# Patient Record
Sex: FEMALE | Race: BLACK OR AFRICAN AMERICAN | NOT HISPANIC OR LATINO | URBAN - METROPOLITAN AREA
[De-identification: names, ages, dates, MRNs, and addresses within clinical notes are randomized per-mention and may not be internally consistent; named-entity substitution may affect disease eponyms.]

---

## 2021-04-22 ENCOUNTER — LAB OUTSIDE AN ENCOUNTER (OUTPATIENT)
Dept: URBAN - METROPOLITAN AREA CLINIC 115 | Facility: CLINIC | Age: 56
End: 2021-04-22

## 2021-04-22 ENCOUNTER — OFFICE VISIT (OUTPATIENT)
Dept: URBAN - METROPOLITAN AREA CLINIC 115 | Facility: CLINIC | Age: 56
End: 2021-04-22
Payer: COMMERCIAL

## 2021-04-22 ENCOUNTER — WEB ENCOUNTER (OUTPATIENT)
Dept: URBAN - METROPOLITAN AREA CLINIC 115 | Facility: CLINIC | Age: 56
End: 2021-04-22

## 2021-04-22 DIAGNOSIS — R10.9 ABDOMINAL PAIN: ICD-10-CM

## 2021-04-22 DIAGNOSIS — R14.3 GAS AND BLOATING: ICD-10-CM

## 2021-04-22 DIAGNOSIS — K21.9 GASTROESOPHAGEAL REFLUX DISEASE, UNSPECIFIED WHETHER ESOPHAGITIS PRESENT: ICD-10-CM

## 2021-04-22 DIAGNOSIS — R19.7 DIARRHEA: ICD-10-CM

## 2021-04-22 PROCEDURE — G8427 DOCREV CUR MEDS BY ELIG CLIN: HCPCS | Performed by: INTERNAL MEDICINE

## 2021-04-22 PROCEDURE — 99214 OFFICE O/P EST MOD 30 MIN: CPT | Performed by: INTERNAL MEDICINE

## 2021-04-22 PROCEDURE — G8417 CALC BMI ABV UP PARAM F/U: HCPCS | Performed by: INTERNAL MEDICINE

## 2021-04-22 PROCEDURE — G9903 PT SCRN TBCO ID AS NON USER: HCPCS | Performed by: INTERNAL MEDICINE

## 2021-04-22 RX ORDER — SODIUM, POTASSIUM,MAG SULFATES 17.5-3.13G
354ML SOLUTION, RECONSTITUTED, ORAL ORAL
Qty: 354 MILLILITER | Refills: 0 | OUTPATIENT
Start: 2021-04-22 | End: 2021-04-23

## 2021-04-22 RX ORDER — DICYCLOMINE HYDROCHLORIDE 10 MG/1
1 TABLET CAPSULE ORAL THREE TIMES A DAY
Qty: 270 TABLET | Refills: 1 | OUTPATIENT
Start: 2021-04-22 | End: 2021-10-19

## 2021-04-22 RX ORDER — CHOLESTYRAMINE 4 G/9G
1 PACKET MIXED WITH WATER OR NON-CARBONATED DRINK POWDER, FOR SUSPENSION ORAL TWICE A DAY
Qty: 60 | Refills: 1 | OUTPATIENT
Start: 2021-04-22

## 2021-04-22 RX ORDER — FLUTICASONE PROPIONATE 50 UG/1
SPRAY, METERED NASAL
Qty: 0 | Refills: 0 | Status: ACTIVE | COMMUNITY
Start: 1900-01-01

## 2021-04-22 RX ORDER — OMEPRAZOLE 40 MG/1
1 CAPSULE 30 MINUTES BEFORE MORNING MEAL CAPSULE, DELAYED RELEASE ORAL ONCE A DAY
Qty: 90 | Refills: 1 | OUTPATIENT
Start: 2021-04-22

## 2021-04-22 RX ORDER — CYCLOBENZAPRINE HYDROCHLORIDE 10 MG/1
TABLET, FILM COATED ORAL
Qty: 0 | Refills: 0 | Status: ACTIVE | COMMUNITY
Start: 1900-01-01

## 2021-04-22 RX ORDER — TRAMADOL HYDROCHLORIDE 50 MG/1
TABLET ORAL
Qty: 0 | Refills: 0 | Status: ACTIVE | COMMUNITY
Start: 1900-01-01

## 2021-04-22 NOTE — PHYSICAL EXAM GASTROINTESTINAL
Abdomen , soft, RUQ tenderness, nondistended , no guarding or rigidity , no masses palpable , high pitched bowel sounds , Liver and Spleen , no hepatomegaly present , no hepatosplenomegaly , liver nontender , spleen not palpable

## 2021-04-22 NOTE — HPI-TODAY'S VISIT:
04/22/2021 Patient presents for an office visit for evaluation of dyspepsia and abdominal pain. EGD done by Dr. Espinoza in 2016 was normal except for esophagitis. Colonoscopy showed 3mm polyp in the sigmoid colon and ascending colon.   Patient c/o digestion issues after her cholecystectomy done in 2001. Since then her digestion problems have been chronic. She also c/o diarrhea occuring immediately after eating and RUQ pain. She has about 2 episodes of loose bowel a day. Mostly high-fiber foods cause her diarrhea (grains, barley, cereal, oatmeal). Patient does take pantoprazole which helps slightly. She previously took omeprazole for several years. However, she has constant nausea, bloating and gas. She does not have any appetite. She also reports that after extensive time without a bowel movement, she feels very bloated. No family history of colon cancer, but she did have an aunt pass away from rectal cancer. Patient denies any anxiety or depression.

## 2021-04-22 NOTE — HPI-OTHER HISTORIES
02/28/2017 The patient is a 51 year old /Black female, who presents on referral from Taylor Porras MD, for a gastroenterology evaluation for abdominal pain. A copy of this document will be sent to the referring provider.  The patient has described the pain as moderate and dull and with intermittent times when it is sharp occurring constantly, and lasts months at a time. The pain has remained unchanged since it started. The location of the pain is in the RUQ and has been present for the past 7 years. The pain is improved by no particular treatment and is aggravated by no particular treatment. The pain is reproducible with palpation over the area.  Patient has a history of gallbladder disease.  The patient admits to no other complaints.     She had workup for the pain by Dr. Espinoza last June as follows.  EGD:  Mild esophagitis Colonoscopy:  Small polyps otherwise stan. CT abdomen with contrast:  CBD 9mm, intrahepatic cysts. MRI abdomen:  CBD 1.1cm, no choledocholelithiasis, pancreas normal, liver hemangioma. LFT's normal 10/3/2016.  She believes one of her liver tests were abnormal at one point but she is not sure.

## 2021-04-23 LAB
A/G RATIO: 1.7
ALBUMIN: 4.2
ALKALINE PHOSPHATASE: 73
ALT (SGPT): 13
AST (SGOT): 19
BASO (ABSOLUTE): 0.1
BASOS: 1
BILIRUBIN, TOTAL: 0.2
BUN/CREATININE RATIO: 17
BUN: 10
CALCIUM: 9.1
CARBON DIOXIDE, TOTAL: 25
CHLORIDE: 105
CREATININE: 0.6
EGFR IF AFRICN AM: 119
EGFR IF NONAFRICN AM: 103
EOS (ABSOLUTE): 0.2
EOS: 5
GLOBULIN, TOTAL: 2.5
GLUCOSE: 130
HEMATOCRIT: 40.9
HEMATOLOGY COMMENTS:: (no result)
HEMOGLOBIN: 13.3
IMMATURE CELLS: (no result)
IMMATURE GRANS (ABS): 0
IMMATURE GRANULOCYTES: 0
LYMPHS (ABSOLUTE): 1.4
LYMPHS: 34
MCH: 28.3
MCHC: 32.5
MCV: 87
MONOCYTES(ABSOLUTE): 0.3
MONOCYTES: 8
NEUTROPHILS (ABSOLUTE): 2.2
NEUTROPHILS: 52
NRBC: (no result)
PLATELETS: 217
POTASSIUM: 4
PROTEIN, TOTAL: 6.7
RBC: 4.7
RDW: 12.3
SODIUM: 143
WBC: 4.1

## 2021-06-14 ENCOUNTER — OFFICE VISIT (OUTPATIENT)
Dept: URBAN - METROPOLITAN AREA SURGERY CENTER 13 | Facility: SURGERY CENTER | Age: 56
End: 2021-06-14
Payer: COMMERCIAL

## 2021-06-14 DIAGNOSIS — K63.89 BACTERIAL OVERGROWTH SYNDROME: ICD-10-CM

## 2021-06-14 DIAGNOSIS — K62.1 DYSPLASTIC POLYP OF RECTUM: ICD-10-CM

## 2021-06-14 DIAGNOSIS — K52.9 CHRONIC DIARRHEA: ICD-10-CM

## 2021-06-14 PROCEDURE — 45380 COLONOSCOPY AND BIOPSY: CPT | Performed by: INTERNAL MEDICINE

## 2021-06-14 PROCEDURE — G8907 PT DOC NO EVENTS ON DISCHARG: HCPCS | Performed by: INTERNAL MEDICINE

## 2021-06-14 RX ORDER — CYCLOBENZAPRINE HYDROCHLORIDE 10 MG/1
TABLET, FILM COATED ORAL
Qty: 0 | Refills: 0 | Status: ACTIVE | COMMUNITY
Start: 1900-01-01

## 2021-06-14 RX ORDER — DICYCLOMINE HYDROCHLORIDE 10 MG/1
1 TABLET CAPSULE ORAL THREE TIMES A DAY
Qty: 270 TABLET | Refills: 1 | Status: ACTIVE | COMMUNITY
Start: 2021-04-22 | End: 2021-10-19

## 2021-06-14 RX ORDER — FLUTICASONE PROPIONATE 50 UG/1
SPRAY, METERED NASAL
Qty: 0 | Refills: 0 | Status: ACTIVE | COMMUNITY
Start: 1900-01-01

## 2021-06-14 RX ORDER — TRAMADOL HYDROCHLORIDE 50 MG/1
TABLET ORAL
Qty: 0 | Refills: 0 | Status: ACTIVE | COMMUNITY
Start: 1900-01-01

## 2021-06-14 RX ORDER — CHOLESTYRAMINE 4 G/9G
1 PACKET MIXED WITH WATER OR NON-CARBONATED DRINK POWDER, FOR SUSPENSION ORAL TWICE A DAY
Qty: 60 | Refills: 1 | Status: ACTIVE | COMMUNITY
Start: 2021-04-22

## 2021-06-14 RX ORDER — OMEPRAZOLE 40 MG/1
1 CAPSULE 30 MINUTES BEFORE MORNING MEAL CAPSULE, DELAYED RELEASE ORAL ONCE A DAY
Qty: 90 | Refills: 1 | Status: ACTIVE | COMMUNITY
Start: 2021-04-22

## 2021-07-14 ENCOUNTER — OFFICE VISIT (OUTPATIENT)
Dept: URBAN - METROPOLITAN AREA SURGERY CENTER 13 | Facility: SURGERY CENTER | Age: 56
End: 2021-07-14

## 2021-08-13 ENCOUNTER — OFFICE VISIT (OUTPATIENT)
Dept: URBAN - METROPOLITAN AREA SURGERY CENTER 13 | Facility: SURGERY CENTER | Age: 56
End: 2021-08-13
Payer: COMMERCIAL

## 2021-08-13 DIAGNOSIS — K22.8 COLUMNAR-LINED ESOPHAGUS: ICD-10-CM

## 2021-08-13 DIAGNOSIS — R10.13 ABDOMINAL DISCOMFORT, EPIGASTRIC: ICD-10-CM

## 2021-08-13 DIAGNOSIS — K31.89 ACQUIRED DEFORMITY OF DUODENUM: ICD-10-CM

## 2021-08-13 PROCEDURE — G8907 PT DOC NO EVENTS ON DISCHARG: HCPCS | Performed by: INTERNAL MEDICINE

## 2021-08-13 PROCEDURE — 43239 EGD BIOPSY SINGLE/MULTIPLE: CPT | Performed by: INTERNAL MEDICINE

## 2021-08-13 RX ORDER — FLUTICASONE PROPIONATE 50 UG/1
SPRAY, METERED NASAL
Qty: 0 | Refills: 0 | Status: ACTIVE | COMMUNITY
Start: 1900-01-01

## 2021-08-13 RX ORDER — CHOLESTYRAMINE 4 G/9G
1 PACKET MIXED WITH WATER OR NON-CARBONATED DRINK POWDER, FOR SUSPENSION ORAL TWICE A DAY
Qty: 60 | Refills: 1 | Status: ACTIVE | COMMUNITY
Start: 2021-04-22

## 2021-08-13 RX ORDER — PANTOPRAZOLE SODIUM 40 MG/1
1 TABLET TABLET, DELAYED RELEASE ORAL ONCE A DAY
Qty: 90 | Refills: 1 | OUTPATIENT
Start: 2021-08-13

## 2021-08-13 RX ORDER — DICYCLOMINE HYDROCHLORIDE 10 MG/1
1 TABLET CAPSULE ORAL THREE TIMES A DAY
Qty: 270 TABLET | Refills: 1 | Status: ACTIVE | COMMUNITY
Start: 2021-04-22 | End: 2021-10-19

## 2021-08-13 RX ORDER — OMEPRAZOLE 40 MG/1
1 CAPSULE 30 MINUTES BEFORE MORNING MEAL CAPSULE, DELAYED RELEASE ORAL ONCE A DAY
Qty: 90 | Refills: 1 | Status: ACTIVE | COMMUNITY
Start: 2021-04-22

## 2021-08-13 RX ORDER — TRAMADOL HYDROCHLORIDE 50 MG/1
TABLET ORAL
Qty: 0 | Refills: 0 | Status: ACTIVE | COMMUNITY
Start: 1900-01-01

## 2021-08-13 RX ORDER — CYCLOBENZAPRINE HYDROCHLORIDE 10 MG/1
TABLET, FILM COATED ORAL
Qty: 0 | Refills: 0 | Status: ACTIVE | COMMUNITY
Start: 1900-01-01

## 2021-09-16 ENCOUNTER — OFFICE VISIT (OUTPATIENT)
Dept: URBAN - METROPOLITAN AREA CLINIC 115 | Facility: CLINIC | Age: 56
End: 2021-09-16

## 2021-09-16 RX ORDER — DICYCLOMINE HYDROCHLORIDE 10 MG/1
1 TABLET CAPSULE ORAL THREE TIMES A DAY
Qty: 270 TABLET | Refills: 1 | Status: ACTIVE | COMMUNITY
Start: 2021-04-22 | End: 2021-10-19

## 2021-09-16 RX ORDER — FLUTICASONE PROPIONATE 50 UG/1
SPRAY, METERED NASAL
Qty: 0 | Refills: 0 | Status: ACTIVE | COMMUNITY
Start: 1900-01-01

## 2021-09-16 RX ORDER — TRAMADOL HYDROCHLORIDE 50 MG/1
TABLET ORAL
Qty: 0 | Refills: 0 | Status: ACTIVE | COMMUNITY
Start: 1900-01-01

## 2021-09-16 RX ORDER — CYCLOBENZAPRINE HYDROCHLORIDE 10 MG/1
TABLET, FILM COATED ORAL
Qty: 0 | Refills: 0 | Status: ACTIVE | COMMUNITY
Start: 1900-01-01

## 2021-09-16 RX ORDER — OMEPRAZOLE 40 MG/1
1 CAPSULE 30 MINUTES BEFORE MORNING MEAL CAPSULE, DELAYED RELEASE ORAL ONCE A DAY
Qty: 90 | Refills: 1 | Status: ACTIVE | COMMUNITY
Start: 2021-04-22

## 2021-09-16 RX ORDER — CHOLESTYRAMINE 4 G/9G
1 PACKET MIXED WITH WATER OR NON-CARBONATED DRINK POWDER, FOR SUSPENSION ORAL TWICE A DAY
Qty: 60 | Refills: 1 | Status: ACTIVE | COMMUNITY
Start: 2021-04-22

## 2021-09-16 RX ORDER — PANTOPRAZOLE SODIUM 40 MG/1
1 TABLET TABLET, DELAYED RELEASE ORAL ONCE A DAY
Qty: 90 | Refills: 1 | Status: ACTIVE | COMMUNITY
Start: 2021-08-13

## 2021-10-28 ENCOUNTER — OFFICE VISIT (OUTPATIENT)
Dept: URBAN - METROPOLITAN AREA CLINIC 115 | Facility: CLINIC | Age: 56
End: 2021-10-28
Payer: COMMERCIAL

## 2021-10-28 ENCOUNTER — OFFICE VISIT (OUTPATIENT)
Dept: URBAN - METROPOLITAN AREA CLINIC 115 | Facility: CLINIC | Age: 56
End: 2021-10-28

## 2021-10-28 DIAGNOSIS — K21.9 GASTROESOPHAGEAL REFLUX DISEASE, UNSPECIFIED WHETHER ESOPHAGITIS PRESENT: ICD-10-CM

## 2021-10-28 DIAGNOSIS — R19.7 DIARRHEA: ICD-10-CM

## 2021-10-28 DIAGNOSIS — R10.9 ABDOMINAL PAIN: ICD-10-CM

## 2021-10-28 DIAGNOSIS — K25.3 ACUTE GASTRIC ULCER WITHOUT HEMORRHAGE OR PERFORATION: ICD-10-CM

## 2021-10-28 DIAGNOSIS — R14.3 GAS AND BLOATING: ICD-10-CM

## 2021-10-28 PROCEDURE — G8427 DOCREV CUR MEDS BY ELIG CLIN: HCPCS | Performed by: INTERNAL MEDICINE

## 2021-10-28 PROCEDURE — G9903 PT SCRN TBCO ID AS NON USER: HCPCS | Performed by: INTERNAL MEDICINE

## 2021-10-28 PROCEDURE — 99214 OFFICE O/P EST MOD 30 MIN: CPT | Performed by: INTERNAL MEDICINE

## 2021-10-28 PROCEDURE — G8417 CALC BMI ABV UP PARAM F/U: HCPCS | Performed by: INTERNAL MEDICINE

## 2021-10-28 RX ORDER — FLUTICASONE PROPIONATE 50 UG/1
SPRAY, METERED NASAL
Qty: 0 | Refills: 0 | Status: ACTIVE | COMMUNITY
Start: 1900-01-01

## 2021-10-28 RX ORDER — DICYCLOMINE HYDROCHLORIDE 10 MG/1
1 TABLET CAPSULE ORAL THREE TIMES A DAY
Qty: 270 TABLET | Refills: 1 | OUTPATIENT
Start: 2021-10-28 | End: 2022-04-26

## 2021-10-28 RX ORDER — CHOLESTYRAMINE 4 G/9G
1 PACKET MIXED WITH WATER OR NON-CARBONATED DRINK POWDER, FOR SUSPENSION ORAL TWICE A DAY
Qty: 60 | Refills: 1 | Status: ACTIVE | COMMUNITY
Start: 2021-04-22

## 2021-10-28 RX ORDER — OMEPRAZOLE 40 MG/1
1 CAPSULE 30 MINUTES BEFORE MORNING MEAL CAPSULE, DELAYED RELEASE ORAL ONCE A DAY
Qty: 90 | Refills: 1 | Status: ACTIVE | COMMUNITY
Start: 2021-04-22

## 2021-10-28 RX ORDER — CYCLOBENZAPRINE HYDROCHLORIDE 10 MG/1
TABLET, FILM COATED ORAL
Qty: 0 | Refills: 0 | Status: ACTIVE | COMMUNITY
Start: 1900-01-01

## 2021-10-28 RX ORDER — PANTOPRAZOLE SODIUM 40 MG/1
1 TABLET TABLET, DELAYED RELEASE ORAL ONCE A DAY
Qty: 90 | Refills: 1 | Status: ACTIVE | COMMUNITY
Start: 2021-08-13

## 2021-10-28 RX ORDER — TRAMADOL HYDROCHLORIDE 50 MG/1
TABLET ORAL
Qty: 0 | Refills: 0 | Status: ACTIVE | COMMUNITY
Start: 1900-01-01

## 2021-10-28 RX ORDER — SUCRALFATE 1 G
1 TABLET ON AN EMPTY STOMACH TABLET ORAL TWICE A DAY
Qty: 180 TABLET | Refills: 1 | OUTPATIENT
Start: 2021-10-28 | End: 2022-04-26

## 2021-10-28 NOTE — HPI-TODAY'S VISIT:
04/22/2021 Patient presents for an office visit for evaluation of dyspepsia and abdominal pain. EGD done by Dr. Espinoza in 2016 was normal except for esophagitis. Colonoscopy showed 3mm polyp in the sigmoid colon and ascending colon.   Patient c/o digestion issues after her cholecystectomy done in 2001. Since then her digestion problems have been chronic. She also c/o diarrhea occuring immediately after eating and RUQ pain. She has about 2 episodes of loose bowel a day. Mostly high-fiber foods cause her diarrhea (grains, barley, cereal, oatmeal). Patient does take pantoprazole which helps slightly. She previously took omeprazole for several years. However, she has constant nausea, bloating and gas. She does not have any appetite. She also reports that after extensive time without a bowel movement, she feels very bloated. No family history of colon cancer, but she did have an aunt pass away from rectal cancer. Patient denies any anxiety or depression.  10/28/2021 Patient presents for a follow up office visit. EGD on 8/13/2021 showed gastric ulcers and gastritis. Biopsies showed no H pylori. Patient uses Ibuprofen daily for chronic lower back pain for the past 20 years. She currently takes Pantoprazole. Bloating has improved on Dicyclomine. RUQ pain has also improved on Dicyclomine but still presents occasionally. Labs from 04/2021 showed normal liver enzymes.

## 2022-01-07 ENCOUNTER — TELEPHONE ENCOUNTER (OUTPATIENT)
Dept: URBAN - METROPOLITAN AREA CLINIC 115 | Facility: CLINIC | Age: 57
End: 2022-01-07

## 2022-01-07 RX ORDER — SUCRALFATE 1 G/10ML
10 ML ON AN EMPTY STOMACH SUSPENSION ORAL TWICE A DAY
Qty: 600 | OUTPATIENT
Start: 2022-01-07 | End: 2022-02-06

## 2022-01-11 ENCOUNTER — TELEPHONE ENCOUNTER (OUTPATIENT)
Dept: URBAN - METROPOLITAN AREA CLINIC 115 | Facility: CLINIC | Age: 57
End: 2022-01-11

## 2022-01-27 ENCOUNTER — LAB OUTSIDE AN ENCOUNTER (OUTPATIENT)
Dept: URBAN - METROPOLITAN AREA CLINIC 115 | Facility: CLINIC | Age: 57
End: 2022-01-27

## 2022-01-27 ENCOUNTER — OFFICE VISIT (OUTPATIENT)
Dept: URBAN - METROPOLITAN AREA CLINIC 115 | Facility: CLINIC | Age: 57
End: 2022-01-27
Payer: COMMERCIAL

## 2022-01-27 DIAGNOSIS — K25.3 ACUTE GASTRIC ULCER WITHOUT HEMORRHAGE OR PERFORATION: ICD-10-CM

## 2022-01-27 DIAGNOSIS — K21.9 GASTROESOPHAGEAL REFLUX DISEASE, UNSPECIFIED WHETHER ESOPHAGITIS PRESENT: ICD-10-CM

## 2022-01-27 DIAGNOSIS — R19.7 DIARRHEA: ICD-10-CM

## 2022-01-27 DIAGNOSIS — R10.9 ABDOMINAL PAIN: ICD-10-CM

## 2022-01-27 DIAGNOSIS — R14.3 GAS AND BLOATING: ICD-10-CM

## 2022-01-27 DIAGNOSIS — K22.5 ZENKER'S DIVERTICULUM: ICD-10-CM

## 2022-01-27 PROCEDURE — G8427 DOCREV CUR MEDS BY ELIG CLIN: HCPCS | Performed by: INTERNAL MEDICINE

## 2022-01-27 PROCEDURE — 99214 OFFICE O/P EST MOD 30 MIN: CPT | Performed by: INTERNAL MEDICINE

## 2022-01-27 PROCEDURE — G8417 CALC BMI ABV UP PARAM F/U: HCPCS | Performed by: INTERNAL MEDICINE

## 2022-01-27 PROCEDURE — G9903 PT SCRN TBCO ID AS NON USER: HCPCS | Performed by: INTERNAL MEDICINE

## 2022-01-27 RX ORDER — DICYCLOMINE HYDROCHLORIDE 10 MG/1
1 TABLET CAPSULE ORAL THREE TIMES A DAY
Qty: 270 TABLET | Refills: 1 | OUTPATIENT
Start: 2022-01-27 | End: 2022-07-26

## 2022-01-27 RX ORDER — TRAMADOL HYDROCHLORIDE 50 MG/1
TABLET ORAL
Qty: 0 | Refills: 0 | Status: ACTIVE | COMMUNITY
Start: 1900-01-01

## 2022-01-27 RX ORDER — CYCLOBENZAPRINE HYDROCHLORIDE 10 MG/1
TABLET, FILM COATED ORAL
Qty: 0 | Refills: 0 | Status: ACTIVE | COMMUNITY
Start: 1900-01-01

## 2022-01-27 RX ORDER — DICYCLOMINE HYDROCHLORIDE 10 MG/1
1 TABLET CAPSULE ORAL THREE TIMES A DAY
Qty: 270 TABLET | Refills: 1 | Status: ACTIVE | COMMUNITY
Start: 2021-10-28 | End: 2022-04-26

## 2022-01-27 RX ORDER — PANTOPRAZOLE SODIUM 40 MG/1
1 TABLET TABLET, DELAYED RELEASE ORAL ONCE A DAY
Qty: 90 | Refills: 1 | Status: ACTIVE | COMMUNITY
Start: 2021-08-13

## 2022-01-27 RX ORDER — ESOMEPRAZOLE MAGNESIUM 40 MG/1
1 CAPSULE CAPSULE, DELAYED RELEASE ORAL ONCE A DAY
Qty: 90 CAPSULE | Refills: 1 | OUTPATIENT
Start: 2022-01-27

## 2022-01-27 RX ORDER — CHOLESTYRAMINE 4 G/9G
1 PACKET MIXED WITH WATER OR NON-CARBONATED DRINK POWDER, FOR SUSPENSION ORAL TWICE A DAY
Qty: 60 | Refills: 1 | Status: ACTIVE | COMMUNITY
Start: 2021-04-22

## 2022-01-27 RX ORDER — SUCRALFATE 1 G
1 TABLET ON AN EMPTY STOMACH TABLET ORAL TWICE A DAY
Qty: 180 TABLET | Refills: 1 | Status: ACTIVE | COMMUNITY
Start: 2021-10-28 | End: 2022-04-26

## 2022-01-27 RX ORDER — FLUTICASONE PROPIONATE 50 UG/1
SPRAY, METERED NASAL
Qty: 0 | Refills: 0 | Status: ACTIVE | COMMUNITY
Start: 1900-01-01

## 2022-01-27 RX ORDER — OMEPRAZOLE 40 MG/1
1 CAPSULE 30 MINUTES BEFORE MORNING MEAL CAPSULE, DELAYED RELEASE ORAL ONCE A DAY
Qty: 90 | Refills: 1 | Status: ACTIVE | COMMUNITY
Start: 2021-04-22

## 2022-01-27 RX ORDER — SUCRALFATE 1 G/10ML
10 ML ON AN EMPTY STOMACH SUSPENSION ORAL TWICE A DAY
Qty: 600 | Status: ACTIVE | COMMUNITY
Start: 2022-01-07 | End: 2022-02-06

## 2022-01-27 NOTE — PHYSICAL EXAM GASTROINTESTINAL
Abdomen , soft, right flank tenderness, nondistended , no guarding or rigidity , no masses palpable , normal bowel sounds , Liver and Spleen , no hepatomegaly present , no hepatosplenomegaly , liver nontender , spleen not palpable

## 2022-01-27 NOTE — HPI-TODAY'S VISIT:
04/22/2021 Patient presents for an office visit for evaluation of dyspepsia and abdominal pain. EGD done by Dr. Espinoza in 2016 was normal except for esophagitis. Colonoscopy showed 3mm polyp in the sigmoid colon and ascending colon.   Patient c/o digestion issues after her cholecystectomy done in 2001. Since then her digestion problems have been chronic. She also c/o diarrhea occuring immediately after eating and RUQ pain. She has about 2 episodes of loose bowel a day. Mostly high-fiber foods cause her diarrhea (grains, barley, cereal, oatmeal). Patient does take pantoprazole which helps slightly. She previously took omeprazole for several years. However, she has constant nausea, bloating and gas. She does not have any appetite. She also reports that after extensive time without a bowel movement, she feels very bloated. No family history of colon cancer, but she did have an aunt pass away from rectal cancer. Patient denies any anxiety or depression.  10/28/2021 Patient presents for a follow up office visit. EGD on 8/13/2021 showed gastric ulcers and gastritis. Biopsies showed no H pylori. Patient uses Ibuprofen daily for chronic lower back pain for the past 20 years. She currently takes Pantoprazole. Bloating has improved on Dicyclomine. RUQ pain has also improved on Dicyclomine but still presents occasionally. Labs from 04/2021 showed normal liver enzymes.   01/27/2022 Patient reports she is still experiencing chest pain, esophageal pain, and RUQ pain. She takes Protonix once a day and Dicyclomine as needed. RUQ pain is constant, dull and associated with nausea and lack of appetite. Dicyclomine improves abdominal pain somewhat. Patient does have sciatica and history of kidney stones. She does not notice much difference between Omeprazole and Pantoprazole. Nexium was effective for her in the past. Patient also c/o sore throat and dysphagia for pills. She is unable to swallow Carafate pills, so she has been using Carafate suspension. Barium swallow study done recently showed Zenker's diverticulum and small hiatal hernia. Her ENT doctor recommended surgery for Zenker's diverticulum, and she is scheduled to meet with surgeon at [facility] next week.

## 2022-01-31 ENCOUNTER — TELEPHONE ENCOUNTER (OUTPATIENT)
Dept: URBAN - METROPOLITAN AREA CLINIC 92 | Facility: CLINIC | Age: 57
End: 2022-01-31

## 2022-01-31 RX ORDER — RABEPRAZOLE SODIUM 20 MG/1
1 TABLET TABLET, DELAYED RELEASE ORAL ONCE A DAY
Qty: 90 | Refills: 1 | OUTPATIENT
Start: 2022-01-31

## 2022-02-11 ENCOUNTER — WEB ENCOUNTER (OUTPATIENT)
Dept: URBAN - METROPOLITAN AREA CLINIC 82 | Facility: CLINIC | Age: 57
End: 2022-02-11

## 2022-02-18 ENCOUNTER — WEB ENCOUNTER (OUTPATIENT)
Dept: URBAN - METROPOLITAN AREA CLINIC 115 | Facility: CLINIC | Age: 57
End: 2022-02-18

## 2023-04-20 ENCOUNTER — OFFICE VISIT (OUTPATIENT)
Dept: URBAN - METROPOLITAN AREA CLINIC 115 | Facility: CLINIC | Age: 58
End: 2023-04-20
Payer: COMMERCIAL

## 2023-04-20 VITALS
HEART RATE: 88 BPM | DIASTOLIC BLOOD PRESSURE: 77 MMHG | BODY MASS INDEX: 37.63 KG/M2 | WEIGHT: 220.4 LBS | HEIGHT: 64 IN | TEMPERATURE: 97.1 F | SYSTOLIC BLOOD PRESSURE: 123 MMHG

## 2023-04-20 DIAGNOSIS — K25.3 ACUTE GASTRIC ULCER WITHOUT HEMORRHAGE OR PERFORATION: ICD-10-CM

## 2023-04-20 DIAGNOSIS — R10.9 ABDOMINAL PAIN: ICD-10-CM

## 2023-04-20 DIAGNOSIS — R74.8 ELEVATED LIVER ENZYMES: ICD-10-CM

## 2023-04-20 DIAGNOSIS — K21.9 GASTROESOPHAGEAL REFLUX DISEASE, UNSPECIFIED WHETHER ESOPHAGITIS PRESENT: ICD-10-CM

## 2023-04-20 DIAGNOSIS — R14.3 GAS AND BLOATING: ICD-10-CM

## 2023-04-20 DIAGNOSIS — R19.7 DIARRHEA: ICD-10-CM

## 2023-04-20 DIAGNOSIS — K22.5 ZENKER'S DIVERTICULUM: ICD-10-CM

## 2023-04-20 PROCEDURE — G9903 PT SCRN TBCO ID AS NON USER: HCPCS | Performed by: INTERNAL MEDICINE

## 2023-04-20 PROCEDURE — G8427 DOCREV CUR MEDS BY ELIG CLIN: HCPCS | Performed by: INTERNAL MEDICINE

## 2023-04-20 PROCEDURE — G8417 CALC BMI ABV UP PARAM F/U: HCPCS | Performed by: INTERNAL MEDICINE

## 2023-04-20 PROCEDURE — 99214 OFFICE O/P EST MOD 30 MIN: CPT | Performed by: INTERNAL MEDICINE

## 2023-04-20 RX ORDER — OMEPRAZOLE 40 MG/1
1 CAPSULE 30 MINUTES BEFORE MORNING MEAL CAPSULE, DELAYED RELEASE ORAL ONCE A DAY
Qty: 90 | Refills: 1 | Status: DISCONTINUED | COMMUNITY
Start: 2021-04-22

## 2023-04-20 RX ORDER — FLUTICASONE PROPIONATE 50 UG/1
SPRAY, METERED NASAL
Qty: 0 | Refills: 0 | Status: DISCONTINUED | COMMUNITY
Start: 1900-01-01

## 2023-04-20 RX ORDER — TRAMADOL HYDROCHLORIDE 50 MG/1
TABLET ORAL
Qty: 0 | Refills: 0 | Status: ACTIVE | COMMUNITY
Start: 1900-01-01

## 2023-04-20 RX ORDER — CHOLESTYRAMINE 4 G/9G
1 PACKET MIXED WITH WATER OR NON-CARBONATED DRINK POWDER, FOR SUSPENSION ORAL TWICE A DAY
Qty: 60 | Refills: 1 | Status: DISCONTINUED | COMMUNITY
Start: 2021-04-22

## 2023-04-20 RX ORDER — PANTOPRAZOLE SODIUM 40 MG/1
1 TABLET TABLET, DELAYED RELEASE ORAL ONCE A DAY
Qty: 90 | Refills: 1 | Status: DISCONTINUED | COMMUNITY
Start: 2021-08-13

## 2023-04-20 RX ORDER — ESOMEPRAZOLE MAGNESIUM 40 MG/1
1 CAPSULE CAPSULE, DELAYED RELEASE ORAL ONCE A DAY
Qty: 90 CAPSULE | Refills: 1 | Status: ACTIVE | COMMUNITY
Start: 2022-01-27

## 2023-04-20 RX ORDER — RABEPRAZOLE SODIUM 20 MG/1
1 TABLET TABLET, DELAYED RELEASE ORAL ONCE A DAY
Qty: 90 | Refills: 1 | Status: DISCONTINUED | COMMUNITY
Start: 2022-01-31

## 2023-04-20 RX ORDER — CYCLOBENZAPRINE HYDROCHLORIDE 10 MG/1
TABLET, FILM COATED ORAL
Qty: 0 | Refills: 0 | Status: ACTIVE | COMMUNITY
Start: 1900-01-01

## 2023-04-20 NOTE — HPI-TODAY'S VISIT:
04/22/2021 Patient presents for an office visit for evaluation of dyspepsia and abdominal pain. EGD done by Dr. Espinoza in 2016 was normal except for esophagitis. Colonoscopy showed 3mm polyp in the sigmoid colon and ascending colon.   Patient c/o digestion issues after her cholecystectomy done in 2001. Since then her digestion problems have been chronic. She also c/o diarrhea occuring immediately after eating and RUQ pain. She has about 2 episodes of loose bowel a day. Mostly high-fiber foods cause her diarrhea (grains, barley, cereal, oatmeal). Patient does take pantoprazole which helps slightly. She previously took omeprazole for several years. However, she has constant nausea, bloating and gas. She does not have any appetite. She also reports that after extensive time without a bowel movement, she feels very bloated. No family history of colon cancer, but she did have an aunt pass away from rectal cancer. Patient denies any anxiety or depression.  10/28/2021 Patient presents for a follow up office visit. EGD on 8/13/2021 showed gastric ulcers and gastritis. Biopsies showed no H pylori. Patient uses Ibuprofen daily for chronic lower back pain for the past 20 years. She currently takes Pantoprazole. Bloating has improved on Dicyclomine. RUQ pain has also improved on Dicyclomine but still presents occasionally. Labs from 04/2021 showed normal liver enzymes.   01/27/2022 Patient reports she is still experiencing chest pain, esophageal pain, and RUQ pain. She takes Protonix once a day and Dicyclomine as needed. RUQ pain is constant, dull and associated with nausea and lack of appetite. Dicyclomine improves abdominal pain somewhat. Patient does have sciatica and history of kidney stones. She does not notice much difference between Omeprazole and Pantoprazole. Nexium was effective for her in the past. Patient also c/o sore throat and dysphagia for pills. She is unable to swallow Carafate pills, so she has been using Carafate suspension. Barium swallow study done recently showed Zenker's diverticulum and small hiatal hernia. Her ENT doctor recommended surgery for Zenker's diverticulum, and she is scheduled to meet with surgeon at [facility] next week. 04/22/2021 Patient presents for an office visit for evaluation of dyspepsia and abdominal pain. EGD done by Dr. Espinoza in 2016 was normal except for esophagitis. Colonoscopy showed 3mm polyp in the sigmoid colon and ascending colon.   Patient c/o digestion issues after her cholecystectomy done in 2001. Since then her digestion problems have been chronic. She also c/o diarrhea occuring immediately after eating and RUQ pain. She has about 2 episodes of loose bowel a day. Mostly high-fiber foods cause her diarrhea (grains, barley, cereal, oatmeal). Patient does take pantoprazole which helps slightly. She previously took omeprazole for several years. However, she has constant nausea, bloating and gas. She does not have any appetite. She also reports that after extensive time without a bowel movement, she feels very bloated. No family history of colon cancer, but she did have an aunt pass away from rectal cancer. Patient denies any anxiety or depression.  10/28/2021 Patient presents for a follow up office visit. EGD on 8/13/2021 showed gastric ulcers and gastritis. Biopsies showed no H pylori. Patient uses Ibuprofen daily for chronic lower back pain for the past 20 years. She currently takes Pantoprazole. Bloating has improved on Dicyclomine. RUQ pain has also improved on Dicyclomine but still presents occasionally. Labs from 04/2021 showed normal liver enzymes.   01/27/2022 Patient reports she is still experiencing chest pain, esophageal pain, and RUQ pain. She takes Protonix once a day and Dicyclomine as needed. RUQ pain is constant, dull and associated with nausea and lack of appetite. Dicyclomine improves abdominal pain somewhat. Patient does have sciatica and history of kidney stones. She does not notice much difference between Omeprazole and Pantoprazole. Nexium was effective for her in the past. Patient also c/o sore throat and dysphagia for pills. She is unable to swallow Carafate pills, so she has been using Carafate suspension. Barium swallow study done recently showed Zenker's diverticulum and small hiatal hernia. Her ENT doctor recommended surgery for Zenker's diverticulum, and she is scheduled to meet with surgeon at [facility] next week.  4/20/2023 Patient presents for a follow up. Patient states that she feels a lot better and that the esomeprazole and dicyclomine have really been helping her. She states that sometimes she has trouble swallowing and that she is just careful swallowing. Patient states that she went to see an ENT and that they told her that they wanted to do a procedure for her Zenker's diverticulum, but that she did not want it. Patient states that she does not want to do a procedure for Zenker's right now. Patient states that she does have elevated liver enzymes and that she is trying to lose weight. Patient states that since she is going through Ramadan and that she is fasting, but when she does eat or drink water, that she has diarrhea. She states that she has been on advil due to back pain.

## 2023-04-20 NOTE — HPI-OTHER HISTORIES
02/28/2017 The patient is a 51 year old /Black female, who presents on referral from Taylor Porras MD, for a gastroenterology evaluation for abdominal pain. A copy of this document will be sent to the referring provider. The patient has described the pain as moderate and dull and with intermittent times when it is sharp occurring constantly, and lasts months at a time. The pain has remained unchanged since it started. The location of the pain is in the RUQ and has been present for the past 7 years. The pain is improved by no particular treatment and is aggravated by no particular treatment. The pain is reproducible with palpation over the area. Patient has a history of gallbladder disease. The patient admits to no other complaints. She had workup for the pain by Dr. Espinoza last June as follows.  EGD:  Mild esophagitis Colonoscopy:  Small polyps otherwise stan. CT abdomen with contrast:  CBD 9mm, intrahepatic cysts. MRI abdomen:  CBD 1.1cm, no choledocholelithiasis, pancreas normal, liver hemangioma. LFT's normal 10/3/2016. She believes one of her liver tests were abnormal at one point but she is not sure. 02/28/2017 The patient is a 51 year old /Black female, who presents on referral from Taylor Porras MD, for a gastroenterology evaluation for abdominal pain. A copy of this document will be sent to the referring provider. The patient has described the pain as moderate and dull and with intermittent times when it is sharp occurring constantly, and lasts months at a time. The pain has remained unchanged since it started. The location of the pain is in the RUQ and has been present for the past 7 years. The pain is improved by no particular treatment and is aggravated by no particular treatment. The pain is reproducible with palpation over the area. Patient has a history of gallbladder disease. The patient admits to no other complaints. She had workup for the pain by Dr. Espinoza last June as follows.  EGD:  Mild esophagitis Colonoscopy:  Small polyps otherwise stan. CT abdomen with contrast:  CBD 9mm, intrahepatic cysts. MRI abdomen:  CBD 1.1cm, no choledocholelithiasis, pancreas normal, liver hemangioma. LFT's normal 10/3/2016. She believes one of her liver tests were abnormal at one point but she is not sure.

## 2023-04-21 LAB
A/G RATIO: 1.8
ABSOLUTE BASOPHILS: 58
ABSOLUTE EOSINOPHILS: 139
ABSOLUTE LYMPHOCYTES: 1955
ABSOLUTE MONOCYTES: 377
ABSOLUTE NEUTROPHILS: 3271
ALBUMIN: 4.4
ALKALINE PHOSPHATASE: 62
ALT (SGPT): 15
AST (SGOT): 17
BASOPHILS: 1
BILIRUBIN, TOTAL: 0.5
BUN/CREATININE RATIO: (no result)
BUN: 9
CALCIUM: 9.2
CARBON DIOXIDE, TOTAL: 24
CHLORIDE: 106
CREATININE: 0.65
EGFR: 103
EOSINOPHILS: 2.4
GLOBULIN, TOTAL: 2.5
GLUCOSE: 95
HEMATOCRIT: 39.8
HEMOGLOBIN: 13.1
LYMPHOCYTES: 33.7
MCH: 28
MCHC: 32.9
MCV: 85
MONOCYTES: 6.5
MPV: 10.9
NEUTROPHILS: 56.4
PLATELET COUNT: 231
POTASSIUM: 4
PROTEIN, TOTAL: 6.9
RDW: 14
RED BLOOD CELL COUNT: 4.68
SODIUM: 141
WHITE BLOOD CELL COUNT: 5.8

## 2023-05-02 ENCOUNTER — TELEPHONE ENCOUNTER (OUTPATIENT)
Dept: URBAN - METROPOLITAN AREA CLINIC 82 | Facility: CLINIC | Age: 58
End: 2023-05-02

## 2023-08-10 ENCOUNTER — OFFICE VISIT (OUTPATIENT)
Dept: URBAN - METROPOLITAN AREA CLINIC 115 | Facility: CLINIC | Age: 58
End: 2023-08-10
Payer: COMMERCIAL

## 2023-08-10 VITALS
HEIGHT: 64 IN | SYSTOLIC BLOOD PRESSURE: 114 MMHG | WEIGHT: 217.6 LBS | DIASTOLIC BLOOD PRESSURE: 78 MMHG | HEART RATE: 85 BPM | TEMPERATURE: 96.7 F | BODY MASS INDEX: 37.15 KG/M2

## 2023-08-10 DIAGNOSIS — K21.9 GASTROESOPHAGEAL REFLUX DISEASE, UNSPECIFIED WHETHER ESOPHAGITIS PRESENT: ICD-10-CM

## 2023-08-10 DIAGNOSIS — R14.3 GAS AND BLOATING: ICD-10-CM

## 2023-08-10 DIAGNOSIS — R19.7 DIARRHEA: ICD-10-CM

## 2023-08-10 DIAGNOSIS — K58.2 IRRITABLE BOWEL SYNDROME WITH BOTH CONSTIPATION AND DIARRHEA: ICD-10-CM

## 2023-08-10 DIAGNOSIS — R74.8 ELEVATED LIVER ENZYMES: ICD-10-CM

## 2023-08-10 DIAGNOSIS — K22.5 ZENKER'S DIVERTICULUM: ICD-10-CM

## 2023-08-10 DIAGNOSIS — K25.3 ACUTE GASTRIC ULCER WITHOUT HEMORRHAGE OR PERFORATION: ICD-10-CM

## 2023-08-10 DIAGNOSIS — R10.9 ABDOMINAL PAIN: ICD-10-CM

## 2023-08-10 PROBLEM — 235595009 GASTROESOPHAGEAL REFLUX DISEASE: Status: ACTIVE | Noted: 2021-10-28

## 2023-08-10 PROBLEM — 10743008: Status: ACTIVE | Noted: 2023-08-10

## 2023-08-10 PROCEDURE — 99214 OFFICE O/P EST MOD 30 MIN: CPT | Performed by: INTERNAL MEDICINE

## 2023-08-10 RX ORDER — TRAMADOL HYDROCHLORIDE 50 MG/1
TABLET ORAL
Qty: 0 | Refills: 0 | Status: ACTIVE | COMMUNITY
Start: 1900-01-01

## 2023-08-10 RX ORDER — ESOMEPRAZOLE MAGNESIUM 40 MG/1
1 CAPSULE CAPSULE, DELAYED RELEASE ORAL ONCE A DAY
Qty: 90 CAPSULE | Refills: 1 | Status: ACTIVE | COMMUNITY
Start: 2022-01-27

## 2023-08-10 RX ORDER — CYCLOBENZAPRINE HYDROCHLORIDE 10 MG/1
TABLET, FILM COATED ORAL
Qty: 0 | Refills: 0 | Status: ACTIVE | COMMUNITY
Start: 1900-01-01

## 2023-08-10 RX ORDER — DICYCLOMINE HYDROCHLORIDE 10 MG/1
1 TABLET CAPSULE ORAL THREE TIMES A DAY
Qty: 270 TABLET | Refills: 1 | OUTPATIENT
Start: 2023-08-10 | End: 2024-02-05

## 2023-08-10 RX ORDER — ESOMEPRAZOLE MAGNESIUM 40 MG/1
1 CAPSULE CAPSULE, DELAYED RELEASE ORAL ONCE A DAY
Qty: 90 CAPSULE | Refills: 1 | OUTPATIENT
Start: 2023-08-10

## 2023-08-10 NOTE — HPI-TODAY'S VISIT:
04/22/2021 Patient presents for an office visit for evaluation of dyspepsia and abdominal pain. EGD done by Dr. Espinoza in 2016 was normal except for esophagitis. Colonoscopy showed 3mm polyp in the sigmoid colon and ascending colon.   Patient c/o digestion issues after her cholecystectomy done in 2001. Since then her digestion problems have been chronic. She also c/o diarrhea occuring immediately after eating and RUQ pain. She has about 2 episodes of loose bowel a day. Mostly high-fiber foods cause her diarrhea (grains, barley, cereal, oatmeal). Patient does take pantoprazole which helps slightly. She previously took omeprazole for several years. However, she has constant nausea, bloating and gas. She does not have any appetite. She also reports that after extensive time without a bowel movement, she feels very bloated. No family history of colon cancer, but she did have an aunt pass away from rectal cancer. Patient denies any anxiety or depression.  10/28/2021 Patient presents for a follow up office visit. EGD on 8/13/2021 showed gastric ulcers and gastritis. Biopsies showed no H pylori. Patient uses Ibuprofen daily for chronic lower back pain for the past 20 years. She currently takes Pantoprazole. Bloating has improved on Dicyclomine. RUQ pain has also improved on Dicyclomine but still presents occasionally. Labs from 04/2021 showed normal liver enzymes.   01/27/2022 Patient reports she is still experiencing chest pain, esophageal pain, and RUQ pain. She takes Protonix once a day and Dicyclomine as needed. RUQ pain is constant, dull and associated with nausea and lack of appetite. Dicyclomine improves abdominal pain somewhat. Patient does have sciatica and history of kidney stones. She does not notice much difference between Omeprazole and Pantoprazole. Nexium was effective for her in the past. Patient also c/o sore throat and dysphagia for pills. She is unable to swallow Carafate pills, so she has been using Carafate suspension. Barium swallow study done recently showed Zenker's diverticulum and small hiatal hernia. Her ENT doctor recommended surgery for Zenker's diverticulum, and she is scheduled to meet with surgeon at [facility] next week. 04/22/2021 Patient presents for an office visit for evaluation of dyspepsia and abdominal pain. EGD done by Dr. Espinoza in 2016 was normal except for esophagitis. Colonoscopy showed 3mm polyp in the sigmoid colon and ascending colon.   Patient c/o digestion issues after her cholecystectomy done in 2001. Since then her digestion problems have been chronic. She also c/o diarrhea occuring immediately after eating and RUQ pain. She has about 2 episodes of loose bowel a day. Mostly high-fiber foods cause her diarrhea (grains, barley, cereal, oatmeal). Patient does take pantoprazole which helps slightly. She previously took omeprazole for several years. However, she has constant nausea, bloating and gas. She does not have any appetite. She also reports that after extensive time without a bowel movement, she feels very bloated. No family history of colon cancer, but she did have an aunt pass away from rectal cancer. Patient denies any anxiety or depression.  10/28/2021 Patient presents for a follow up office visit. EGD on 8/13/2021 showed gastric ulcers and gastritis. Biopsies showed no H pylori. Patient uses Ibuprofen daily for chronic lower back pain for the past 20 years. She currently takes Pantoprazole. Bloating has improved on Dicyclomine. RUQ pain has also improved on Dicyclomine but still presents occasionally. Labs from 04/2021 showed normal liver enzymes.   01/27/2022 Patient reports she is still experiencing chest pain, esophageal pain, and RUQ pain. She takes Protonix once a day and Dicyclomine as needed. RUQ pain is constant, dull and associated with nausea and lack of appetite. Dicyclomine improves abdominal pain somewhat. Patient does have sciatica and history of kidney stones. She does not notice much difference between Omeprazole and Pantoprazole. Nexium was effective for her in the past. Patient also c/o sore throat and dysphagia for pills. She is unable to swallow Carafate pills, so she has been using Carafate suspension. Barium swallow study done recently showed Zenker's diverticulum and small hiatal hernia. Her ENT doctor recommended surgery for Zenker's diverticulum, and she is scheduled to meet with surgeon at [facility] next week.  4/20/2023 Patient presents for a follow up. Patient states that she feels a lot better and that the esomeprazole and dicyclomine have really been helping her. She states that sometimes she has trouble swallowing and that she is just careful swallowing. Patient states that she went to see an ENT and that they told her that they wanted to do a procedure for her Zenker's diverticulum, but that she did not want it. Patient states that she does not want to do a procedure for Zenker's right now. Patient states that she does have elevated liver enzymes and that she is trying to lose weight. Patient states that since she is going through Ramadan and that she is fasting, but when she does eat or drink water, that she has diarrhea. She states that she has been on advil due to back pain.  8/10/2023 Patient presents for a follow up. Patient states that she is still having a little trouble with swallowing. She states that she saw an ENT doctor. She states that she is still on esomeprazole. She does have stomach pain and that she is careful with what she eats. She does not currently take probiotics. Patient states that dicyclomine has helped her in the past.

## 2024-02-15 ENCOUNTER — OV EP (OUTPATIENT)
Dept: URBAN - METROPOLITAN AREA CLINIC 115 | Facility: CLINIC | Age: 59
End: 2024-02-15

## 2024-03-14 ENCOUNTER — LAB (OUTPATIENT)
Dept: URBAN - METROPOLITAN AREA CLINIC 115 | Facility: CLINIC | Age: 59
End: 2024-03-14

## 2024-03-14 ENCOUNTER — OV EP (OUTPATIENT)
Dept: URBAN - METROPOLITAN AREA CLINIC 115 | Facility: CLINIC | Age: 59
End: 2024-03-14
Payer: COMMERCIAL

## 2024-03-14 VITALS
HEART RATE: 80 BPM | DIASTOLIC BLOOD PRESSURE: 75 MMHG | HEIGHT: 64 IN | SYSTOLIC BLOOD PRESSURE: 110 MMHG | WEIGHT: 218.2 LBS | BODY MASS INDEX: 37.25 KG/M2 | TEMPERATURE: 95.9 F

## 2024-03-14 DIAGNOSIS — R74.8 ELEVATED LIVER ENZYMES: ICD-10-CM

## 2024-03-14 DIAGNOSIS — K25.3 ACUTE GASTRIC ULCER WITHOUT HEMORRHAGE OR PERFORATION: ICD-10-CM

## 2024-03-14 DIAGNOSIS — K58.2 IRRITABLE BOWEL SYNDROME WITH BOTH CONSTIPATION AND DIARRHEA: ICD-10-CM

## 2024-03-14 DIAGNOSIS — K21.9 GASTROESOPHAGEAL REFLUX DISEASE, UNSPECIFIED WHETHER ESOPHAGITIS PRESENT: ICD-10-CM

## 2024-03-14 DIAGNOSIS — R14.3 GAS AND BLOATING: ICD-10-CM

## 2024-03-14 DIAGNOSIS — R19.7 DIARRHEA: ICD-10-CM

## 2024-03-14 DIAGNOSIS — R10.9 ABDOMINAL PAIN: ICD-10-CM

## 2024-03-14 DIAGNOSIS — K22.5 ZENKER'S DIVERTICULUM: ICD-10-CM

## 2024-03-14 PROCEDURE — 99214 OFFICE O/P EST MOD 30 MIN: CPT | Performed by: INTERNAL MEDICINE

## 2024-03-14 RX ORDER — DICYCLOMINE HYDROCHLORIDE 10 MG/1
1 TABLET CAPSULE ORAL THREE TIMES A DAY
Qty: 270 TABLET | Refills: 1 | OUTPATIENT
Start: 2024-03-14 | End: 2024-09-10

## 2024-03-14 RX ORDER — TRAMADOL HYDROCHLORIDE 50 MG/1
TABLET ORAL
Qty: 0 | Refills: 0 | Status: ACTIVE | COMMUNITY
Start: 1900-01-01

## 2024-03-14 RX ORDER — CYCLOBENZAPRINE HYDROCHLORIDE 10 MG/1
TABLET, FILM COATED ORAL
Qty: 0 | Refills: 0 | Status: ACTIVE | COMMUNITY
Start: 1900-01-01

## 2024-03-14 RX ORDER — ESOMEPRAZOLE MAGNESIUM 40 MG/1
1 CAPSULE CAPSULE, DELAYED RELEASE ORAL ONCE A DAY
Qty: 90 CAPSULE | Refills: 1 | Status: ACTIVE | COMMUNITY
Start: 2023-08-10

## 2024-03-14 RX ORDER — ESOMEPRAZOLE MAGNESIUM 40 MG/1
1 CAPSULE CAPSULE, DELAYED RELEASE ORAL ONCE A DAY
Qty: 90 CAPSULE | Refills: 1 | OUTPATIENT
Start: 2024-03-14

## 2024-03-14 RX ORDER — ESOMEPRAZOLE MAGNESIUM 40 MG/1
1 CAPSULE CAPSULE, DELAYED RELEASE ORAL ONCE A DAY
Qty: 90 CAPSULE | Refills: 1 | Status: ACTIVE | COMMUNITY
Start: 2022-01-27

## 2024-03-14 NOTE — HPI-TODAY'S VISIT:
04/22/2021 Patient presents for an office visit for evaluation of dyspepsia and abdominal pain. EGD done by Dr. Espinoza in 2016 was normal except for esophagitis. Colonoscopy showed 3mm polyp in the sigmoid colon and ascending colon.   Patient c/o digestion issues after her cholecystectomy done in 2001. Since then her digestion problems have been chronic. She also c/o diarrhea occuring immediately after eating and RUQ pain. She has about 2 episodes of loose bowel a day. Mostly high-fiber foods cause her diarrhea (grains, barley, cereal, oatmeal). Patient does take pantoprazole which helps slightly. She previously took omeprazole for several years. However, she has constant nausea, bloating and gas. She does not have any appetite. She also reports that after extensive time without a bowel movement, she feels very bloated. No family history of colon cancer, but she did have an aunt pass away from rectal cancer. Patient denies any anxiety or depression.  10/28/2021 Patient presents for a follow up office visit. EGD on 8/13/2021 showed gastric ulcers and gastritis. Biopsies showed no H pylori. Patient uses Ibuprofen daily for chronic lower back pain for the past 20 years. She currently takes Pantoprazole. Bloating has improved on Dicyclomine. RUQ pain has also improved on Dicyclomine but still presents occasionally. Labs from 04/2021 showed normal liver enzymes.   01/27/2022 Patient reports she is still experiencing chest pain, esophageal pain, and RUQ pain. She takes Protonix once a day and Dicyclomine as needed. RUQ pain is constant, dull and associated with nausea and lack of appetite. Dicyclomine improves abdominal pain somewhat. Patient does have sciatica and history of kidney stones. She does not notice much difference between Omeprazole and Pantoprazole. Nexium was effective for her in the past. Patient also c/o sore throat and dysphagia for pills. She is unable to swallow Carafate pills, so she has been using Carafate suspension. Barium swallow study done recently showed Zenker's diverticulum and small hiatal hernia. Her ENT doctor recommended surgery for Zenker's diverticulum, and she is scheduled to meet with surgeon at [place] next week. 04/22/2021 Patient presents for an office visit for evaluation of dyspepsia and abdominal pain. EGD done by Dr. Espinoza in 2016 was normal except for esophagitis. Colonoscopy showed 3mm polyp in the sigmoid colon and ascending colon.   Patient c/o digestion issues after her cholecystectomy done in 2001. Since then her digestion problems have been chronic. She also c/o diarrhea occuring immediately after eating and RUQ pain. She has about 2 episodes of loose bowel a day. Mostly high-fiber foods cause her diarrhea (grains, barley, cereal, oatmeal). Patient does take pantoprazole which helps slightly. She previously took omeprazole for several years. However, she has constant nausea, bloating and gas. She does not have any appetite. She also reports that after extensive time without a bowel movement, she feels very bloated. No family history of colon cancer, but she did have an aunt pass away from rectal cancer. Patient denies any anxiety or depression.  10/28/2021 Patient presents for a follow up office visit. EGD on 8/13/2021 showed gastric ulcers and gastritis. Biopsies showed no H pylori. Patient uses Ibuprofen daily for chronic lower back pain for the past 20 years. She currently takes Pantoprazole. Bloating has improved on Dicyclomine. RUQ pain has also improved on Dicyclomine but still presents occasionally. Labs from 04/2021 showed normal liver enzymes.   01/27/2022 Patient reports she is still experiencing chest pain, esophageal pain, and RUQ pain. She takes Protonix once a day and Dicyclomine as needed. RUQ pain is constant, dull and associated with nausea and lack of appetite. Dicyclomine improves abdominal pain somewhat. Patient does have sciatica and history of kidney stones. She does not notice much difference between Omeprazole and Pantoprazole. Nexium was effective for her in the past. Patient also c/o sore throat and dysphagia for pills. She is unable to swallow Carafate pills, so she has been using Carafate suspension. Barium swallow study done recently showed Zenker's diverticulum and small hiatal hernia. Her ENT doctor recommended surgery for Zenker's diverticulum, and she is scheduled to meet with surgeon at [place] next week.  4/20/2023 Patient presents for a follow up. Patient states that she feels a lot better and that the esomeprazole and dicyclomine have really been helping her. She states that sometimes she has trouble swallowing and that she is just careful swallowing. Patient states that she went to see an ENT and that they told her that they wanted to do a procedure for her Zenker's diverticulum, but that she did not want it. Patient states that she does not want to do a procedure for Zenker's right now. Patient states that she does have elevated liver enzymes and that she is trying to lose weight. Patient states that since she is going through Ramadan and that she is fasting, but when she does eat or drink water, that she has diarrhea. She states that she has been on advil due to back pain.  8/10/2023 Patient presents for a follow up. Patient states that she is still having a little trouble with swallowing. She states that she saw an ENT doctor. She states that she is still on esomeprazole. She does have stomach pain and that she is careful with what she eats. She does not currently take probiotics. Patient states that dicyclomine has helped her in the past.  3/14/2024 Patient presents for stomach pain. Patient states that her acid reflux is under control on nexium. She states that she is also on dicyclomine. Patient has a little of abdominal pain and bloating as well as change in bowel habits. She has been having more constipation lately. Patient states that sometimes when she eats meat, she feels when all of the foods go down and sometimes has dysphagia.

## 2024-05-03 ENCOUNTER — OFFICE VISIT (OUTPATIENT)
Dept: URBAN - METROPOLITAN AREA SURGERY CENTER 13 | Facility: SURGERY CENTER | Age: 59
End: 2024-05-03

## 2024-06-05 ENCOUNTER — TELEPHONE ENCOUNTER (OUTPATIENT)
Dept: URBAN - METROPOLITAN AREA CLINIC 115 | Facility: CLINIC | Age: 59
End: 2024-06-05

## 2024-09-12 ENCOUNTER — OFFICE VISIT (OUTPATIENT)
Dept: URBAN - METROPOLITAN AREA CLINIC 115 | Facility: CLINIC | Age: 59
End: 2024-09-12
Payer: COMMERCIAL

## 2024-09-12 ENCOUNTER — DASHBOARD ENCOUNTERS (OUTPATIENT)
Age: 59
End: 2024-09-12

## 2024-09-12 VITALS
WEIGHT: 220.2 LBS | BODY MASS INDEX: 37.59 KG/M2 | HEART RATE: 85 BPM | TEMPERATURE: 98.4 F | SYSTOLIC BLOOD PRESSURE: 113 MMHG | HEIGHT: 64 IN | DIASTOLIC BLOOD PRESSURE: 74 MMHG

## 2024-09-12 DIAGNOSIS — K22.5 ZENKER'S DIVERTICULUM: ICD-10-CM

## 2024-09-12 DIAGNOSIS — R10.9 ABDOMINAL PAIN: ICD-10-CM

## 2024-09-12 DIAGNOSIS — K25.3 ACUTE GASTRIC ULCER WITHOUT HEMORRHAGE OR PERFORATION: ICD-10-CM

## 2024-09-12 DIAGNOSIS — K58.2 IRRITABLE BOWEL SYNDROME WITH BOTH CONSTIPATION AND DIARRHEA: ICD-10-CM

## 2024-09-12 DIAGNOSIS — R19.7 DIARRHEA: ICD-10-CM

## 2024-09-12 DIAGNOSIS — K21.9 GASTROESOPHAGEAL REFLUX DISEASE, UNSPECIFIED WHETHER ESOPHAGITIS PRESENT: ICD-10-CM

## 2024-09-12 DIAGNOSIS — K92.1 MELENA: ICD-10-CM

## 2024-09-12 DIAGNOSIS — R74.8 ELEVATED LIVER ENZYMES: ICD-10-CM

## 2024-09-12 DIAGNOSIS — R14.3 GAS AND BLOATING: ICD-10-CM

## 2024-09-12 PROBLEM — 2901004: Status: ACTIVE | Noted: 2024-09-12

## 2024-09-12 PROCEDURE — 99214 OFFICE O/P EST MOD 30 MIN: CPT | Performed by: INTERNAL MEDICINE

## 2024-09-12 RX ORDER — CYCLOBENZAPRINE HYDROCHLORIDE 10 MG/1
TABLET, FILM COATED ORAL
Qty: 0 | Refills: 0 | Status: ACTIVE | COMMUNITY
Start: 1900-01-01

## 2024-09-12 RX ORDER — ESOMEPRAZOLE MAGNESIUM 40 MG/1
1 CAPSULE CAPSULE, DELAYED RELEASE ORAL ONCE A DAY
Qty: 90 CAPSULE | Refills: 1 | Status: ACTIVE | COMMUNITY
Start: 2022-01-27

## 2024-09-12 RX ORDER — TRAMADOL HYDROCHLORIDE 50 MG/1
TABLET ORAL
Qty: 0 | Refills: 0 | Status: ACTIVE | COMMUNITY
Start: 1900-01-01

## 2024-09-12 NOTE — HPI-TODAY'S VISIT:
04/22/2021 Patient presents for an office visit for evaluation of dyspepsia and abdominal pain. EGD done by Dr. Espinoza in 2016 was normal except for esophagitis. Colonoscopy showed 3mm polyp in the sigmoid colon and ascending colon.   Patient c/o digestion issues after her cholecystectomy done in 2001. Since then her digestion problems have been chronic. She also c/o diarrhea occuring immediately after eating and RUQ pain. She has about 2 episodes of loose bowel a day. Mostly high-fiber foods cause her diarrhea (grains, barley, cereal, oatmeal). Patient does take pantoprazole which helps slightly. She previously took omeprazole for several years. However, she has constant nausea, bloating and gas. She does not have any appetite. She also reports that after extensive time without a bowel movement, she feels very bloated. No family history of colon cancer, but she did have an aunt pass away from rectal cancer. Patient denies any anxiety or depression.  10/28/2021 Patient presents for a follow up office visit. EGD on 8/13/2021 showed gastric ulcers and gastritis. Biopsies showed no H pylori. Patient uses Ibuprofen daily for chronic lower back pain for the past 20 years. She currently takes Pantoprazole. Bloating has improved on Dicyclomine. RUQ pain has also improved on Dicyclomine but still presents occasionally. Labs from 04/2021 showed normal liver enzymes.   01/27/2022 Patient reports she is still experiencing chest pain, esophageal pain, and RUQ pain. She takes Protonix once a day and Dicyclomine as needed. RUQ pain is constant, dull and associated with nausea and lack of appetite. Dicyclomine improves abdominal pain somewhat. Patient does have sciatica and history of kidney stones. She does not notice much difference between Omeprazole and Pantoprazole. Nexium was effective for her in the past. Patient also c/o sore throat and dysphagia for pills. She is unable to swallow Carafate pills, so she has been using Carafate suspension. Barium swallow study done recently showed Zenker's diverticulum and small hiatal hernia. Her ENT doctor recommended surgery for Zenker's diverticulum, and she is scheduled to meet with surgeon at [place] next week. 04/22/2021 Patient presents for an office visit for evaluation of dyspepsia and abdominal pain. EGD done by Dr. Espinoza in 2016 was normal except for esophagitis. Colonoscopy showed 3mm polyp in the sigmoid colon and ascending colon.   Patient c/o digestion issues after her cholecystectomy done in 2001. Since then her digestion problems have been chronic. She also c/o diarrhea occuring immediately after eating and RUQ pain. She has about 2 episodes of loose bowel a day. Mostly high-fiber foods cause her diarrhea (grains, barley, cereal, oatmeal). Patient does take pantoprazole which helps slightly. She previously took omeprazole for several years. However, she has constant nausea, bloating and gas. She does not have any appetite. She also reports that after extensive time without a bowel movement, she feels very bloated. No family history of colon cancer, but she did have an aunt pass away from rectal cancer. Patient denies any anxiety or depression.  10/28/2021 Patient presents for a follow up office visit. EGD on 8/13/2021 showed gastric ulcers and gastritis. Biopsies showed no H pylori. Patient uses Ibuprofen daily for chronic lower back pain for the past 20 years. She currently takes Pantoprazole. Bloating has improved on Dicyclomine. RUQ pain has also improved on Dicyclomine but still presents occasionally. Labs from 04/2021 showed normal liver enzymes.   01/27/2022 Patient reports she is still experiencing chest pain, esophageal pain, and RUQ pain. She takes Protonix once a day and Dicyclomine as needed. RUQ pain is constant, dull and associated with nausea and lack of appetite. Dicyclomine improves abdominal pain somewhat. Patient does have sciatica and history of kidney stones. She does not notice much difference between Omeprazole and Pantoprazole. Nexium was effective for her in the past. Patient also c/o sore throat and dysphagia for pills. She is unable to swallow Carafate pills, so she has been using Carafate suspension. Barium swallow study done recently showed Zenker's diverticulum and small hiatal hernia. Her ENT doctor recommended surgery for Zenker's diverticulum, and she is scheduled to meet with surgeon at [place] next week.  4/20/2023 Patient presents for a follow up. Patient states that she feels a lot better and that the esomeprazole and dicyclomine have really been helping her. She states that sometimes she has trouble swallowing and that she is just careful swallowing. Patient states that she went to see an ENT and that they told her that they wanted to do a procedure for her Zenker's diverticulum, but that she did not want it. Patient states that she does not want to do a procedure for Zenker's right now. Patient states that she does have elevated liver enzymes and that she is trying to lose weight. Patient states that since she is going through Ramadan and that she is fasting, but when she does eat or drink water, that she has diarrhea. She states that she has been on advil due to back pain.  8/10/2023 Patient presents for a follow up. Patient states that she is still having a little trouble with swallowing. She states that she saw an ENT doctor. She states that she is still on esomeprazole. She does have stomach pain and that she is careful with what she eats. She does not currently take probiotics. Patient states that dicyclomine has helped her in the past.  3/14/2024 Patient presents for stomach pain. Patient states that her acid reflux is under control on nexium. She states that she is also on dicyclomine. Patient has a little of abdominal pain and bloating as well as change in bowel habits. She has been having more constipation lately. Patient states that sometimes when she eats meat, she feels when all of the foods go down and sometimes has dysphagia.   9/12/2024  Patient presents for a 6 month follow up. Patient says that she sometimes has constipation and sometimes she has bloating. She has been taking esomprazole, and dicyclomine prn. She says that she always has nausea, and she drinks lemon and it helps. Patient has been on ozempic for 2 years and says that it did not help her lose weight. Patient says that she has been having darker colored stool, she denies blood in the stool. Patient does take chronic NSAIDs.

## 2024-09-20 ENCOUNTER — OFFICE VISIT (OUTPATIENT)
Dept: URBAN - METROPOLITAN AREA SURGERY CENTER 13 | Facility: SURGERY CENTER | Age: 59
End: 2024-09-20
Payer: COMMERCIAL

## 2024-09-20 ENCOUNTER — CLAIMS CREATED FROM THE CLAIM WINDOW (OUTPATIENT)
Dept: URBAN - METROPOLITAN AREA CLINIC 4 | Facility: CLINIC | Age: 59
End: 2024-09-20
Payer: COMMERCIAL

## 2024-09-20 DIAGNOSIS — K25.7 CHRONIC GASTRIC ULCER WITHOUT HEMORRHAGE OR PERFORATION: ICD-10-CM

## 2024-09-20 DIAGNOSIS — K31.89 OTHER DISEASES OF STOMACH AND DUODENUM: ICD-10-CM

## 2024-09-20 DIAGNOSIS — K31.89 ACHYLIA: ICD-10-CM

## 2024-09-20 DIAGNOSIS — K92.1 ACUTE MELENA: ICD-10-CM

## 2024-09-20 DIAGNOSIS — K25.7 ANTRAL ULCER, CHRONIC: ICD-10-CM

## 2024-09-20 PROCEDURE — 43239 EGD BIOPSY SINGLE/MULTIPLE: CPT | Performed by: INTERNAL MEDICINE

## 2024-09-20 PROCEDURE — 88342 IMHCHEM/IMCYTCHM 1ST ANTB: CPT | Performed by: PATHOLOGY

## 2024-09-20 PROCEDURE — 00731 ANES UPR GI NDSC PX NOS: CPT | Performed by: ANESTHESIOLOGY

## 2024-09-20 PROCEDURE — 00731 ANES UPR GI NDSC PX NOS: CPT | Performed by: NURSE ANESTHETIST, CERTIFIED REGISTERED

## 2024-09-20 PROCEDURE — 88341 IMHCHEM/IMCYTCHM EA ADD ANTB: CPT | Performed by: PATHOLOGY

## 2024-09-20 PROCEDURE — 88305 TISSUE EXAM BY PATHOLOGIST: CPT | Performed by: PATHOLOGY

## 2024-09-20 RX ORDER — ESOMEPRAZOLE MAGNESIUM 40 MG/1
1 CAPSULE CAPSULE, DELAYED RELEASE ORAL ONCE A DAY
Qty: 90 CAPSULE | Refills: 1 | Status: ACTIVE | COMMUNITY
Start: 2022-01-27

## 2024-09-20 RX ORDER — SUCRALFATE 1 G/1
1 TABLET ON AN EMPTY STOMACH TABLET ORAL TWICE A DAY
Qty: 180 TABLET | Refills: 1 | OUTPATIENT
Start: 2024-09-20 | End: 2025-03-19

## 2024-09-20 RX ORDER — CYCLOBENZAPRINE HYDROCHLORIDE 10 MG/1
TABLET, FILM COATED ORAL
Qty: 0 | Refills: 0 | Status: ACTIVE | COMMUNITY
Start: 1900-01-01

## 2024-09-20 RX ORDER — TRAMADOL HYDROCHLORIDE 50 MG/1
TABLET ORAL
Qty: 0 | Refills: 0 | Status: ACTIVE | COMMUNITY
Start: 1900-01-01

## 2024-09-27 ENCOUNTER — CLAIMS CREATED FROM THE CLAIM WINDOW (OUTPATIENT)
Dept: URBAN - METROPOLITAN AREA SURGERY CENTER 13 | Facility: SURGERY CENTER | Age: 59
End: 2024-09-27

## 2024-09-27 PROCEDURE — 00731 ANES UPR GI NDSC PX NOS: CPT | Performed by: NURSE ANESTHETIST, CERTIFIED REGISTERED
